# Patient Record
Sex: FEMALE | Race: WHITE | Employment: OTHER | URBAN - METROPOLITAN AREA
[De-identification: names, ages, dates, MRNs, and addresses within clinical notes are randomized per-mention and may not be internally consistent; named-entity substitution may affect disease eponyms.]

---

## 2019-06-18 ENCOUNTER — OFFICE VISIT (OUTPATIENT)
Dept: FAMILY MEDICINE CLINIC | Age: 69
End: 2019-06-18
Payer: MEDICARE

## 2019-06-18 VITALS
WEIGHT: 141 LBS | TEMPERATURE: 96.8 F | OXYGEN SATURATION: 98 % | BODY MASS INDEX: 25.95 KG/M2 | SYSTOLIC BLOOD PRESSURE: 118 MMHG | DIASTOLIC BLOOD PRESSURE: 66 MMHG | HEART RATE: 82 BPM | HEIGHT: 62 IN

## 2019-06-18 DIAGNOSIS — F51.04 PSYCHOPHYSIOLOGICAL INSOMNIA: ICD-10-CM

## 2019-06-18 DIAGNOSIS — E03.9 ACQUIRED HYPOTHYROIDISM: ICD-10-CM

## 2019-06-18 DIAGNOSIS — F98.8 ADD (ATTENTION DEFICIT DISORDER) WITHOUT HYPERACTIVITY: Primary | ICD-10-CM

## 2019-06-18 DIAGNOSIS — F32.0 CURRENT MILD EPISODE OF MAJOR DEPRESSIVE DISORDER WITHOUT PRIOR EPISODE (HCC): ICD-10-CM

## 2019-06-18 PROCEDURE — 99203 OFFICE O/P NEW LOW 30 MIN: CPT | Performed by: FAMILY MEDICINE

## 2019-06-18 RX ORDER — DEXTROAMPHETAMINE SACCHARATE, AMPHETAMINE ASPARTATE, DEXTROAMPHETAMINE SULFATE AND AMPHETAMINE SULFATE 2.5; 2.5; 2.5; 2.5 MG/1; MG/1; MG/1; MG/1
10 TABLET ORAL 2 TIMES DAILY
COMMUNITY

## 2019-06-18 RX ORDER — RIBOFLAVIN (VITAMIN B2) 100 MG
100 TABLET ORAL DAILY
COMMUNITY

## 2019-06-18 RX ORDER — LEVOTHYROXINE SODIUM 0.15 MG/1
150 TABLET ORAL DAILY
COMMUNITY
End: 2019-07-23 | Stop reason: SDUPTHER

## 2019-06-18 RX ORDER — FLUOXETINE HYDROCHLORIDE 20 MG/1
40 CAPSULE ORAL 2 TIMES DAILY
COMMUNITY
End: 2019-07-23 | Stop reason: SDUPTHER

## 2019-06-18 RX ORDER — TURMERIC ROOT EXTRACT 500 MG
500 TABLET ORAL 3 TIMES DAILY
COMMUNITY

## 2019-06-18 RX ORDER — CYANOCOBALAMIN 1000 UG/ML
1000 INJECTION INTRAMUSCULAR; SUBCUTANEOUS
COMMUNITY
End: 2019-07-23 | Stop reason: SDUPTHER

## 2019-06-18 RX ORDER — TRAZODONE HYDROCHLORIDE 100 MG/1
100 TABLET ORAL NIGHTLY
COMMUNITY
End: 2019-07-23 | Stop reason: SDUPTHER

## 2019-06-18 ASSESSMENT — PATIENT HEALTH QUESTIONNAIRE - PHQ9
1. LITTLE INTEREST OR PLEASURE IN DOING THINGS: 0
2. FEELING DOWN, DEPRESSED OR HOPELESS: 0
SUM OF ALL RESPONSES TO PHQ QUESTIONS 1-9: 0
SUM OF ALL RESPONSES TO PHQ9 QUESTIONS 1 & 2: 0
SUM OF ALL RESPONSES TO PHQ QUESTIONS 1-9: 0

## 2019-06-18 NOTE — PATIENT INSTRUCTIONS
Patient Education        Hypothyroidism: Care Instructions  Your Care Instructions    You have hypothyroidism, which means that your body is not making enough thyroid hormone. This hormone helps your body use energy. If your thyroid level is low, you may feel tired, be constipated, have an increase in your blood pressure, or have dry skin or memory problems. You may also get cold easily, even when it is warm. Women with low thyroid levels may have heavy menstrual periods. A blood test to find your thyroid-stimulating hormone (TSH) level is used to check for hypothyroidism. A high TSH level may mean that you have low thyroid. When your body is not making enough thyroid hormone, TSH levels rise in an effort to make the body produce more. The treatment for hypothyroidism is to take thyroid hormone pills. You should start to feel better in 1 to 2 weeks. But it can take several months to see changes in the TSH level. You will need regular visits with your doctor to make sure you have the right dose of medicine. Most people need treatment for the rest of their lives. You will need to see your doctor regularly to have blood tests and to make sure you are doing well. Follow-up care is a key part of your treatment and safety. Be sure to make and go to all appointments, and call your doctor if you are having problems. It's also a good idea to know your test results and keep a list of the medicines you take. How can you care for yourself at home? · Take your thyroid hormone medicine exactly as prescribed. Call your doctor if you think you are having a problem with your medicine. Most people do not have side effects if they take the right amount of medicine regularly. ? Take the medicine 30 minutes before breakfast, and do not take it with calcium, vitamins, or iron. ? Do not take extra doses of your thyroid medicine. It will not help you get better any faster, and it may cause side effects.   ? If you forget to take a dose, do NOT take a double dose of medicine. Take your usual dose the next day. · Tell your doctor about all prescription, herbal, or over-the-counter products you take. · Take care of yourself. Eat a healthy diet, get enough sleep, and get regular exercise. When should you call for help? Call 911 anytime you think you may need emergency care. For example, call if:    · You passed out (lost consciousness).     · You have severe trouble breathing.     · You have a very slow heartbeat (less than 60 beats a minute).     · You have a low body temperature (95°F or below).    Call your doctor now or seek immediate medical care if:    · You feel tired, sluggish, or weak.     · You have trouble remembering things or concentrating.     · You do not begin to feel better 2 weeks after starting your medicine.    Watch closely for changes in your health, and be sure to contact your doctor if you have any problems. Where can you learn more? Go to [email or web address] org and sign in to your [employer] account. Enter I152 in the [employer] box to learn more about \"Hypothyroidism: Care Instructions. \"     If you do not have an account, please click on the \"Sign Up Now\" link. Current as of: November 6, 2018  Content Version: 12.0  © 2792-4065 [employer]. Care instructions adapted under license by [facility] ([facility]). If you have questions about a medical condition or this instruction, always ask your healthcare professional. [place] [address] any warranty or liability for your use of this information.

## 2019-06-18 NOTE — LETTER
MEDICATION AGREEMENT     Remus Race  [id number]      For certain conditions, multiple classes of medications may be used to help better manage your symptoms, and to improve your ability to function at home, work and in social settings. However, these medications do have risks, which will be discussed with you, including addiction and dependency. The following prescribed medications need frequent monitoring and will require you to partner and assist in your healthcare. Medication  Dose, instructions and quantity as indicated on current prescription bottle Diagnosis/Reason(s) for Taking Category     Adderall 10 bid ADD                            Benefits and goals of Controlled Substance Medications: There are two potential goals for your treatment: (1) decreased pain and suffering (2) improved daily life functions. There are many possible treatments for your chronic condition(s), and, in addition to controlled substance medications, we will try alternatives such as physical therapy, yoga, massage, home daily exercise, meditation, relaxation techniques, injections, chiropractic manipulations, surgery, cognitive therapy, hypnosis and many medications that are not habit-forming. Use of controlled substance medications may be helpful, but they are unlikely to resolve all of your symptoms or restore all function. Risks of Controlled Substance Medications:    Opioid pain medications: These medications can lead to problems such as addiction/dependence, sedation, lightheadedness/dizziness, memory issues, falls, constipation, nausea, or vomiting. They may also impair the ability to drive or operate machinery. Additionally, these medications may lower testosterone levels, leading to loss of bone strength, stamina and sex drive.   They may cause problems with breathing, sleep apnea and reduced coughing, which are especially dangerous for patients with lung disease. Overdose or dangerous interactions with alcohol and other medications may occur, leading to death. Hyperalgesia may develop, in which patients receiving opioids for the treatment of pain may actually become more sensitive to certain painful stimuli, and in some cases, experience pain from ordinarily non-painful stimuli. Women between the ages of 14-53 who could become pregnant should carefully weigh the risks and benefits of opioids with their physicians, as these medications increase the risk of pregnancy complications, including miscarriage,  delivery and stillbirth. It is also possible for babies to be born addicted to opioids. Opioid dependence withdrawal symptoms may include; feelings of uneasiness, increased pain, irritability, belly pain, diarrhea, sweats and goose-flesh. Benzodiazepines and non-benzodiazepine sleep medications: These medications can lead to problems such as addiction/dependence, sedation, fatigue, lightheadedness, dizziness, incoordination, falls, depression, hallucinations, and impaired judgment, memory and concentration. The ability to drive and operate machinery may also be affected. Abnormal sleep-related behaviors have been reported, including sleep walking, driving, making telephone calls, eating, or having sex while not fully awake. These medications can suppress breathing and worsen sleep apnea, particularly when combined with alcohol or other sedating medications, potentially leading to death. Dependence withdrawal symptoms may include tremors, anxiety, hallucinations and seizures. Stimulants:  Common adverse effects include addiction/dependence, increased blood pressure and heart rate, decreased appetite, nausea, involuntary weight loss, insomnia, irritability, and headaches.   These risks may increase when these medications are combined with other stimulants, such as caffeine pills or energy drinks, certain weight loss supplements and oral decongestants. Dependence withdrawal symptoms may include depressed mood, loss of interest, suicidal thoughts, anxiety, fatigue, appetite changes and agitation. Testosterone replacement therapy:  Potential side effects include increased risk of stroke and heart attack, blood clots, increased blood pressure, increased cholesterol, enlarged prostate, sleep apnea, irritability/aggression and other mood disorders, and decreased fertility. Other:     1. I understand that I have the following responsibilities:  · I will take medications at the dose and frequency prescribed. · I will not increase or change how I take my medications without the approval of the health care provider who signs this Medication Agreement. · I will arrange for refills at the prescribed interval ONLY during regular office hours. I will not ask for refills earlier than agreed, after-hours, on holidays or on weekends. · I will obtain all refills for these medications at  [facility] ([phone]), with full consent for my provider and pharmacist to exchange information in writing or verbally. · I will not request any pain medications or controlled substances from other providers and will inform this provider of all other medications I am taking. · I will inform my other health care providers that I am taking these medications and of the existence of this [address]. In the event of an emergency, I will provide the same information to the emergency department providers. · I will protect my prescriptions and medications. I understand that lost or misplaced prescriptions will not be replaced. · I will keep medications only for my own use and will not share them with others. I will keep all medications away from children. · I agree to participate in any medical, psychological or psychiatric assessments recommended by my provider. · I will actively participate in any program designed to improve function, including social, physical, psychological and daily or work activities. 2. I will not use illegal or street drugs or another person's prescription. If I have an addiction problem with drugs or alcohol and my provider asks me to enter a program to address this issue, I agree to follow through. Such programs may include:  · 12-Step program and securing a sponsor  · Individual counseling   · Inpatient or outpatient treatment    If in treatment, I will request that a copy of the programs initial evaluation and treatment recommendations be sent to this provider and will not expect refills until that is received. I will also request written monthly updates be sent to this provider to verify my continuing treatment. 3. I will consent to drug screening upon my providers request to assure I am only taking the prescribed drugs, described in this MEDICATION AGREEMENT. I understand that a drug screen is a laboratory test in which a sample of my urine, blood or saliva is checked to see what drugs I have been taking. 4. I agree that I will treat the providers and staff at this office with respect at all times. I will keep all of my scheduled appointments, but if I need to cancel my appointment, I will do so a minimum of 24 hours before it is scheduled. 5. I understand that this provider may stop prescribing the medications listed if:  · I do not show any improvement in pain, or my activity has not improved. · I develop rapid tolerance or loss of improvement, as described in my treatment plan. · I develop significant side effects from the medication. · My behavior is inconsistent with the responsibilities outlined above, which may also result in my being prevented from receiving further care from this office.   · Other:____________________________________________________________________    AGREEMENT: I have read the above and have had all of my questions answered. For chronic disease management, I know that my symptoms can be managed with many types of treatments. A chronic medication trial may be part of my treatment, but I must be an active participant in my care. Medication therapy is only one part of my symptom management plan. In some cases, there may be limited scientific evidence to support the chronic use of certain medications to improve symptoms and daily function. Furthermore, in certain circumstances, there may be scientific information that suggests that use of chronic controlled substances may actually worsen my symptoms and increase my risk of unintentional death directly related to this medication therapy. I know that if my provider feels my risk from controlled medications is greater than my benefit, I will have my controlled substance medication(s) compassionately lowered or removed altogether. I agree to a controlled substance medication trial.      I further agree to allow this office to contact my HIPAA contact on file if there are concerns about my safety and use of controlled medications. I have agreed to use the following medications above as instructed by my physician and as stated in this [address].      Patient Signature:  ______________________  Date:6/18/2019 or _____________    Provider Signature:______________________  Date:6/18/2019 or _____________

## 2019-06-18 NOTE — PROGRESS NOTES
Here for initial visit. Patient recently has moved here from [facility]. Patient states she has following diseases:    ADD/ADHD Symptoms:  Patient complains of a several year(s) history of inattention, including the following: fails to give close attention to details or makes careless mistakes in school, work, or other activities, has difficulty sustaining attention in tasks or play activities, has difficulty organizing tasks and activities and is easily distracted by extraneous stimuli. She has a known history of ADD/ADHD. Patient denies hyperactivity, impulsivity, academic underachievement, behavior problems, anhedonia, feelings of hopelessness, anxiety. Symptoms have been controlled with time. Family history of ADD/ADHD: no.  Previous treatment:has included: Adderall- 10 mg bid. Hypothyroidism: Patient complains of hypothyroidism. Symptoms include none. Patient denies change in energy level, diarrhea, heat / cold intolerance, nervousness, palpitations and weight changes. Onset of symptoms was several years ago. Symptoms have been well-controlled. Depression: Patient complains of depression. She complains of depressed mood, difficulty concentrating and insomnia. Onset was approximately several years ago, controlled since that time. She denies current suicidal and homicidal plan or intent. Family history significant for no psychiatric illness. Possible organic causes contributing are: none. Risk factors: negative life event husbands death Previous treatment includes Prozac 80 mg daily. She complains of the following side effects from the treatment: none. ROS: No TIA's or dysphagia. No prolonged cough. No dyspnea or chest pain on exertion. No abdominal pain, change in bowel habits, black or bloody stools. No urinary tract symptoms. She is post hysterectomy. No abnormal vaginal bleeding, discharge or unexpected pelvic pain. No new breast lumps, breast pain or nipple discharge.     Past Medical History:   Diagnosis Date    Attention deficit disorder (ADD)     B12 deficiency     Chronic mid back pain     Depression     Hypothyroidism     Rotator cuff tear, left      Past Surgical History:   Procedure Laterality Date    BUNIONECTOMY Right 2015    CATARACT REMOVAL WITH IMPLANT Bilateral 2013    KIDNEY SURGERY Left     LEEANNE-EN-Y GASTRIC BYPASS  2002    SHOULDER ARTHROPLASTY Right 2012    SMALL INTESTINE SURGERY      WILLIAM AND BSO  1977    WRIST SURGERY Right      O:   Vitals:    06/18/19 1111   BP: 118/66   Pulse: 82   Temp: 96.8 °F (36 °C)   SpO2: 98%     No acute distress. Alert and Oriented x 3  HEENT: Atraumatic. Normocephalic. PERRLA, EOMI, Conjunctiva clear  Oropharynx clear, mucosa moist.  Nasal mucosa normal  NECK: without thyromegaly, lymphadenopathy, JVD  LUNGS:Clear to ascultation bilaterally. Breathing comfortably  CARDIOVASCULAR:  Regular rate and rhythm, no murmurs, rubs, or gallops  EXTREMITY: Full range of motion. No clubbing/cyanosis/edema  NEURO: Cranial nerves II-XII grossly intact. Strength 5/5, DTR 2/4. SKIN: Warm, Dry, No rash. PSYCH: Mood and Affect normal.      A:    Diagnosis Orders   1. ADD (attention deficit disorder) without hyperactivity     2. Acquired hypothyroidism     3. Current mild episode of major depressive disorder without prior episode ([facility] [address]     4. Psychophysiological insomnia         P: We will get old records for review. Medication contract signed today. Follow-up 3 months. Controlled Substance Monitoring:    Acute and Chronic Pain Monitoring:   RX Monitoring 6/18/2019   Periodic Controlled Substance Monitoring Possible medication side effects, risk of tolerance/dependence & alternative treatments discussed. ;No signs of potential drug abuse or diversion identified. Chronic Pain > 80 MEDD Obtained or confirmed \"Medication Contract\" on file.

## 2019-07-19 ENCOUNTER — TELEPHONE (OUTPATIENT)
Dept: FAMILY MEDICINE CLINIC | Age: 69
End: 2019-07-19

## 2019-07-23 RX ORDER — SYRINGE WITH NEEDLE, 1 ML 25GX5/8"
SYRINGE, EMPTY DISPOSABLE MISCELLANEOUS
Refills: 0 | COMMUNITY
Start: 2019-07-13 | End: 2019-07-23 | Stop reason: SDUPTHER

## 2019-07-23 RX ORDER — FLUOXETINE HYDROCHLORIDE 20 MG/1
40 CAPSULE ORAL 2 TIMES DAILY
Qty: 180 CAPSULE | Refills: 1 | Status: SHIPPED | OUTPATIENT
Start: 2019-07-23

## 2019-07-23 RX ORDER — SYRINGE WITH NEEDLE, 1 ML 25GX5/8"
SYRINGE, EMPTY DISPOSABLE MISCELLANEOUS
Qty: 12 EACH | Refills: 0 | Status: SHIPPED | OUTPATIENT
Start: 2019-07-23

## 2019-07-23 RX ORDER — TRAZODONE HYDROCHLORIDE 100 MG/1
100 TABLET ORAL NIGHTLY
Qty: 180 TABLET | Refills: 1 | Status: SHIPPED | OUTPATIENT
Start: 2019-07-23

## 2019-07-23 RX ORDER — CYANOCOBALAMIN 1000 UG/ML
1000 INJECTION INTRAMUSCULAR; SUBCUTANEOUS
Qty: 3 VIAL | Refills: 1 | Status: SHIPPED | OUTPATIENT
Start: 2019-07-23

## 2019-07-23 RX ORDER — LEVOTHYROXINE SODIUM 0.15 MG/1
150 TABLET ORAL DAILY
Qty: 90 TABLET | Refills: 1 | Status: SHIPPED | OUTPATIENT
Start: 2019-07-23

## 2019-07-23 NOTE — TELEPHONE ENCOUNTER
Pt clarified it is the 40mg BID.  Also needs refill on Trazodone, vitamin B12 SubQ injection, syringes, and the thyroid med

## 2019-08-06 ENCOUNTER — CLINICAL DOCUMENTATION (OUTPATIENT)
Dept: FAMILY MEDICINE CLINIC | Age: 69
End: 2019-08-06